# Patient Record
(demographics unavailable — no encounter records)

---

## 2025-03-31 NOTE — HISTORY OF PRESENT ILLNESS
[de-identified] :  Ervin Caraballo is a 64-year-old male who presents today with right knee pain that started over 20 years ago in the lateral area. He has a history of a Meniscectomy done in year 80'-90' as well has a History of an ACL injury that was not repaired. Patient feels the most pain rising from a seat, squatting, and going up stairs. Pain is ranged from 8-10 and is described as sharp and getting worse over time. Last Cortisone injection was 4 months ago. 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  [ Right Knee ]   Onset of Knee Symptoms: # 20 years ago    Knee Symptoms: Pain with Activity, Walking, at Rest Swelling front knee, behind knee Loss of Motion, Limping, Decrease Walk Distance Weakness, Giving Way, Instability feeling Grinding / Crunching Painful Clicking, Locking in a position   Location of Pain: Inner Side of Knee, Outer Side of Knee Back of Knee, Front of Knee Just Above Kneecap, Just Below Kneecap, Behind Kneecap, In Calf Muscles Outer Side of Lower Leg, Inner Side of Lower Leg Low Back Pain, Hip Pain: Outer Side   Duration of Pain: Daily   Pain Level (0-10): 8-10   Character of Pain: Aching, Sharp Pain is Getting Worse    Painful Activities: Walking, Walking after Sitting, Squatting, Lunges Rising From a Seat Getting On / Off a Toilet Stairs Going: Up / Down   Knee History: History of Knee Trauma: Yes  Meniscus tear: Yes, Unsure of side   Non-Surgical Knee Treatments: Rest, Ice, Cortisone Inj    Surgical Knee History: Knee Surgery in the past Meniscectomy : unsure of side, [id number]

## 2025-03-31 NOTE — DISCUSSION/SUMMARY
[de-identified] : Assessment:  - Diagnosis: Tricompartmental osteoarthritis of the knee most severe laterally where it is bone on bone.  Severe. Right Knee.    Symptoms:  - Knee pain with weightbearing activity. - Non-operative treatments no longer provide adequate pain relief and patient has significant functional decline.    Exam Pertinent Findings: - Exam: multiple compartment pain and or tenderness.  - Exam: Intact ACL, PCL, LCL, MCL. - Exam: Motor function of extensor mechanism is normal. Motor function of ankle is normal. - Exam: Sensory Exam to light touch has no major deficits. - Exam: Painful gait. Limping gait. - Exam: Range of Motion: 0-110    Imaging: - X-rays: Severe lateral  joint space loss from osteoarthritis . Severe, Bone on Bone.    Plan:  - The patient meets the criteria as a Good to Excellent candidate for: Total Knee Replacement.\ - The side, RIght. - He has a 15 degree valgus deformity. - He has moderate to severe lower leg edema. - He has chronic vascular insufficiency changes in lower legs. - He will need to where compression stocking prior ot surgery and get a vascular consult. - I had a discussion with the patient about the option, risks, and benefits of Total knee replacement for their knee condition. - The patient expressed understanding of the diagnosis and the options. - The patient will consider a Total Knee Replacement.  - The procedure would use the [employer] Robotic-Arm Total Knee System. - The implants used are the Triathlon knee system from [place]. - The materials are Titanium Alloy tibial implants, CoCr Alloy femoral implants, Polyethylene tibial bearings. - Cement may or may not be used for fixation based on the patients intraoperative bone quality.  - The patient was given the opportunity to discuss surgery scheduling with our coordinator. - Our Information packet on our total knee replacement process was given to the patient to take home and review. - The patient was given the opportunity to schedule their procedure today, or call us when they are ready. - We will assist the patient with all the preoperative scheduling and planning requirements if they choose to undergo the procedure. - If the patient schedules their procedure, a preoperative appointment will be scheduled prior to surgery.  - A preoperative visit prior to surgery would be to review the procedure, review the medical clearance(s), and answer any questions the patient may have.   Discussion Total Knee Replacement:  - I had a discussion with the patient regarding the findings of their history, exam and imaging. We discussed the option of Total Knee Replacement using the precision [employer] Robotic-Arm System and implants from [employer]. We discussed the indications, surgical process, my techniques of surgery, the probable post-operative course and instructions, and the risks and benefits of the procedure.   - Although there are no guarantees to success, I feel that the surgery would be beneficial and there is an excellent chance for a positive outcome.  - The implants used are the Triathlon knee system from [employer]. The materials are Titanium tibial implants, CoCr Alloy femoral implants, Polyethylene tibial bearings.  - The risks of this procedure include but are not limited to the risks of anesthesia, heart attack, stroke, respiratory complications, wound healing problems, skin blisters, delayed wound healing, infection, bleeding, nerve damage, vessel damage, skin sensory changes next to the incisions, loss of motion, scar tissue formation requiring further treatment, blood clots, heterotopic bone formation, implant wear, implant loosening after surgery, allergic responses to the implant materials, continued pain despite proper implant placement, soft tissue pain, possible recurrent knee swelling, and the possibility for further surgery in the future.  - The patient had their questions answered to their satisfaction. The patient understood our discussion that was aided by the use of knee models and implants, and a review of their imaging on the computer screen.   - I look forward to the opportunity to help this patient with their painful knee condition.

## 2025-03-31 NOTE — DISCUSSION/SUMMARY
[de-identified] : Assessment:  - Diagnosis: Tricompartmental osteoarthritis of the knee most severe laterally where it is bone on bone.  Severe. Right Knee.    Symptoms:  - Knee pain with weightbearing activity. - Non-operative treatments no longer provide adequate pain relief and patient has significant functional decline.    Exam Pertinent Findings: - Exam: multiple compartment pain and or tenderness.  - Exam: Intact ACL, PCL, LCL, MCL. - Exam: Motor function of extensor mechanism is normal. Motor function of ankle is normal. - Exam: Sensory Exam to light touch has no major deficits. - Exam: Painful gait. Limping gait. - Exam: Range of Motion: 0-110    Imaging: - X-rays: Severe lateral  joint space loss from osteoarthritis . Severe, Bone on Bone.    Plan:  - The patient meets the criteria as a Good to Excellent candidate for: Total Knee Replacement.\ - The side, RIght. - He has a 15 degree valgus deformity. - He has moderate to severe lower leg edema. - He has chronic vascular insufficiency changes in lower legs. - He will need to where compression stocking prior ot surgery and get a vascular consult. - I had a discussion with the patient about the option, risks, and benefits of Total knee replacement for their knee condition. - The patient expressed understanding of the diagnosis and the options. - The patient will consider a Total Knee Replacement.  - The procedure would use the [employer] Robotic-Arm Total Knee System. - The implants used are the Triathlon knee system from [place]. - The materials are Titanium Alloy tibial implants, CoCr Alloy femoral implants, Polyethylene tibial bearings. - Cement may or may not be used for fixation based on the patients intraoperative bone quality.  - The patient was given the opportunity to discuss surgery scheduling with our coordinator. - Our Information packet on our total knee replacement process was given to the patient to take home and review. - The patient was given the opportunity to schedule their procedure today, or call us when they are ready. - We will assist the patient with all the preoperative scheduling and planning requirements if they choose to undergo the procedure. - If the patient schedules their procedure, a preoperative appointment will be scheduled prior to surgery.  - A preoperative visit prior to surgery would be to review the procedure, review the medical clearance(s), and answer any questions the patient may have.   Discussion Total Knee Replacement:  - I had a discussion with the patient regarding the findings of their history, exam and imaging. We discussed the option of Total Knee Replacement using the precision Kevin Robotic-Arm System and implants from [employer]. We discussed the indications, surgical process, my techniques of surgery, the probable post-operative course and instructions, and the risks and benefits of the procedure.   - Although there are no guarantees to success, I feel that the surgery would be beneficial and there is an excellent chance for a positive outcome.  - The implants used are the Triathlon knee system from [employer]. The materials are Titanium tibial implants, CoCr Alloy femoral implants, Polyethylene tibial bearings.  - The risks of this procedure include but are not limited to the risks of anesthesia, heart attack, stroke, respiratory complications, wound healing problems, skin blisters, delayed wound healing, infection, bleeding, nerve damage, vessel damage, skin sensory changes next to the incisions, loss of motion, scar tissue formation requiring further treatment, blood clots, heterotopic bone formation, implant wear, implant loosening after surgery, allergic responses to the implant materials, continued pain despite proper implant placement, soft tissue pain, possible recurrent knee swelling, and the possibility for further surgery in the future.  - The patient had their questions answered to their satisfaction. The patient understood our discussion that was aided by the use of knee models and implants, and a review of their imaging on the computer screen.   - I look forward to the opportunity to help this patient with their painful knee condition.

## 2025-03-31 NOTE — PHYSICAL EXAM
[de-identified] : Right Knee/Lower Extremity:   Skin: Edema 1+ pittinhg in lower leg. Signs of chronic PVD. Signs of chronic cellulitis in the past..                   Incisions: Healed arthroscopy portals.   Swelling Knee Joint: Positive effusion: Mild    Swelling Popliteal Swelling: None.   Swelling Pre- Patella Bursa:  None.  Alignment is Valgus: Mild .   Alignment is:  Passively correctable to mild valgus.   Range of Motion Extension: 0 degrees.    Range of Motion Flexion: 110 degrees.     Medial Collateral Ligament Laxity: Good endpoint.  Lateral Collateral Ligament Laxity:  Good endpoint. Lateral opening to varus stress.  Mild Moderate .  Knee Joint Line Tenderness:  Tender at lateral joint line. Moderate Tender at medial joint line.  Mild Not tender in the patellofemoral compartment.   Soft Tissue Tenderness: None.  Not tender at the Pes Anserine Bursa, IT Band, Patella tendon, Quad tendon.   ACL Testing: Stable ACL. Good Endpoint.   Anterior Drawer Test: Negative   PCL Testing: Stable PCL.  Good Endpoint. Posterior Drawer Test: Negative   Patellofemoral Crepitus: None.    Patella Compression Test: Negative   Patellofemoral Apprehension Testing: Negative.    Patellofemoral Laxity: Normal.   Motor Strength: 			 Quadriceps strength is 5 out of 5                                                                 Hamstring strength is 5 out of 5                                                                Ankle dorsiflexion strength is 5 out of 5                                                               Ankle plantarflexion strength is 5 out of 5   Sensation: Light tough sensation in the lower extremity is grossly normal.                                                             Pulses: 				 Pulses are faintly palpable at the ankle at the Dorsalis Pedis Artery.                                                                Pulses are faintly  palpable at the Posterior Tibialis Artery.        Lots of edema.                                                          Hip Motion: The patient has painless hip range of motion.                                                          Lumbar Spine Symptoms:  Negative straight leg raise.    Gait: Limping Gait.  Abnormal gait.    Assistive Devices: 	None   [de-identified] : X-ray of the Right Knee:  AP Standing View: Lateral joint space loss.Severe. Medial joint space preserved.  PA Flexion View: Lateral joint space loss. Severe. Bone on Bone. Medial joint space preserved.   Lateral View:  Patellofemoral joint space preserved.  Point Place View:  Patellofemoral joint space is preserved. Patellofemoral joint central tracking. Osteophytes seen on lateral femoral condyle  Bilateral Hip to Ankle Standing View: Alignment: Valgus 15 degrees. Left knee is 0 degrees. Lateral joint space loss. . Severe.  Hips: No significant changes

## 2025-03-31 NOTE — HISTORY OF PRESENT ILLNESS
[de-identified] :  Ervin Caraballo is a 64-year-old male who presents today with right knee pain that started over 20 years ago in the lateral area. He has a history of a Meniscectomy done in year 80'-90' as well has a History of an ACL injury that was not repaired. Patient feels the most pain rising from a seat, squatting, and going up stairs. Pain is ranged from 8-10 and is described as sharp and getting worse over time. Last Cortisone injection was 4 months ago. 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  [ Right Knee ]   Onset of Knee Symptoms: # 20 years ago    Knee Symptoms: Pain with Activity, Walking, at Rest Swelling front knee, behind knee Loss of Motion, Limping, Decrease Walk Distance Weakness, Giving Way, Instability feeling Grinding / Crunching Painful Clicking, Locking in a position   Location of Pain: Inner Side of Knee, Outer Side of Knee Back of Knee, Front of Knee Just Above Kneecap, Just Below Kneecap, Behind Kneecap, In Calf Muscles Outer Side of Lower Leg, Inner Side of Lower Leg Low Back Pain, Hip Pain: Outer Side   Duration of Pain: Daily   Pain Level (0-10): 8-10   Character of Pain: Aching, Sharp Pain is Getting Worse    Painful Activities: Walking, Walking after Sitting, Squatting, Lunges Rising From a Seat Getting On / Off a Toilet Stairs Going: Up / Down   Knee History: History of Knee Trauma: Yes  Meniscus tear: Yes, Unsure of side   Non-Surgical Knee Treatments: Rest, Ice, Cortisone Inj    Surgical Knee History: Knee Surgery in the past Meniscectomy : unsure of side, [id number]

## 2025-03-31 NOTE — PHYSICAL EXAM
[de-identified] : Right Knee/Lower Extremity:   Skin: Edema 1+ pittinhg in lower leg. Signs of chronic PVD. Signs of chronic cellulitis in the past..                   Incisions: Healed arthroscopy portals.   Swelling Knee Joint: Positive effusion: Mild    Swelling Popliteal Swelling: None.   Swelling Pre- Patella Bursa:  None.  Alignment is Valgus: Mild .   Alignment is:  Passively correctable to mild valgus.   Range of Motion Extension: 0 degrees.    Range of Motion Flexion: 110 degrees.     Medial Collateral Ligament Laxity: Good endpoint.  Lateral Collateral Ligament Laxity:  Good endpoint. Lateral opening to varus stress.  Mild Moderate .  Knee Joint Line Tenderness:  Tender at lateral joint line. Moderate Tender at medial joint line.  Mild Not tender in the patellofemoral compartment.   Soft Tissue Tenderness: None.  Not tender at the Pes Anserine Bursa, IT Band, Patella tendon, Quad tendon.   ACL Testing: Stable ACL. Good Endpoint.   Anterior Drawer Test: Negative   PCL Testing: Stable PCL.  Good Endpoint. Posterior Drawer Test: Negative   Patellofemoral Crepitus: None.    Patella Compression Test: Negative   Patellofemoral Apprehension Testing: Negative.    Patellofemoral Laxity: Normal.   Motor Strength: 			 Quadriceps strength is 5 out of 5                                                                 Hamstring strength is 5 out of 5                                                                Ankle dorsiflexion strength is 5 out of 5                                                               Ankle plantarflexion strength is 5 out of 5   Sensation: Light tough sensation in the lower extremity is grossly normal.                                                             Pulses: 				 Pulses are faintly palpable at the ankle at the Dorsalis Pedis Artery.                                                                Pulses are faintly  palpable at the Posterior Tibialis Artery.        Lots of edema.                                                          Hip Motion: The patient has painless hip range of motion.                                                          Lumbar Spine Symptoms:  Negative straight leg raise.    Gait: Limping Gait.  Abnormal gait.    Assistive Devices: 	None   [de-identified] : X-ray of the Right Knee:  AP Standing View: Lateral joint space loss.Severe. Medial joint space preserved.  PA Flexion View: Lateral joint space loss. Severe. Bone on Bone. Medial joint space preserved.   Lateral View:  Patellofemoral joint space preserved.  [place] View:  Patellofemoral joint space is preserved. Patellofemoral joint central tracking. Osteophytes seen on lateral femoral condyle  Bilateral Hip to Ankle Standing View: Alignment: Valgus 15 degrees. Left knee is 0 degrees. Lateral joint space loss. . Severe.  Hips: No significant changes